# Patient Record
Sex: MALE | Race: WHITE | NOT HISPANIC OR LATINO | URBAN - METROPOLITAN AREA
[De-identification: names, ages, dates, MRNs, and addresses within clinical notes are randomized per-mention and may not be internally consistent; named-entity substitution may affect disease eponyms.]

---

## 2017-01-07 ENCOUNTER — EMERGENCY (EMERGENCY)
Facility: HOSPITAL | Age: 22
LOS: 1 days | Discharge: LEFT BEFORE DISCHARGE | End: 2017-01-07
Admitting: EMERGENCY MEDICINE

## 2017-01-07 VITALS
HEART RATE: 97 BPM | RESPIRATION RATE: 16 BRPM | DIASTOLIC BLOOD PRESSURE: 81 MMHG | SYSTOLIC BLOOD PRESSURE: 135 MMHG | TEMPERATURE: 97 F | OXYGEN SATURATION: 100 %

## 2017-02-22 ENCOUNTER — EMERGENCY (EMERGENCY)
Facility: HOSPITAL | Age: 22
LOS: 1 days | Discharge: HOME | End: 2017-02-22
Admitting: EMERGENCY MEDICINE
Payer: COMMERCIAL

## 2017-02-22 VITALS
TEMPERATURE: 99 F | RESPIRATION RATE: 18 BRPM | OXYGEN SATURATION: 100 % | HEART RATE: 83 BPM | SYSTOLIC BLOOD PRESSURE: 121 MMHG | DIASTOLIC BLOOD PRESSURE: 62 MMHG

## 2017-02-22 PROCEDURE — 99053 MED SERV 10PM-8AM 24 HR FAC: CPT

## 2017-02-22 PROCEDURE — 12011 RPR F/E/E/N/L/M 2.5 CM/<: CPT

## 2017-02-22 PROCEDURE — 99283 EMERGENCY DEPT VISIT LOW MDM: CPT | Mod: 25

## 2017-02-22 NOTE — ED ADULT TRIAGE NOTE - CHIEF COMPLAINT QUOTE
Pt playing basketball and got elbowed below left eye. 1 inch laceration noted below eye.  Denies HA, blurry vision, LOC. Denies any pain.

## 2017-02-23 RX ORDER — TETANUS TOXOID, REDUCED DIPHTHERIA TOXOID AND ACELLULAR PERTUSSIS VACCINE, ADSORBED 5; 2.5; 8; 8; 2.5 [IU]/.5ML; [IU]/.5ML; UG/.5ML; UG/.5ML; UG/.5ML
0.5 SUSPENSION INTRAMUSCULAR ONCE
Qty: 0 | Refills: 0 | Status: DISCONTINUED | OUTPATIENT
Start: 2017-02-23 | End: 2017-02-23

## 2017-02-23 NOTE — ED PROVIDER NOTE - MEDICAL DECISION MAKING DETAILS
20y/o M w. lac to infraorbital region, no LOC, no facial tenderness, pt does not want plastics. Plan suture lac, tetanus up to date.

## 2017-02-23 NOTE — ED PROVIDER NOTE - OBJECTIVE STATEMENT
20 y/o M no PMHx p/w laceration below the left eye sp getting elbowed while playing basketball earlier today. States he cleaned the area with soap and water, bleeding was controlled, came to the ED to get "checked out". Denies fevers, chills, LOC, headache, head injury, pain with EOM, or any other complaints. Tetanus up to date.

## 2017-02-23 NOTE — ED PROVIDER NOTE - CARE PLAN
Principal Discharge DX:	Laceration  Instructions for follow-up, activity and diet:	Keep sutures covered and dry for 24 hours then clean with soap and water daily.  Apply bacitracin and cover.  Return to ED for suture removal in 3 days. Any increased pain, redness, streaking (red lines), swelling, fever, chills return to ER. Take Tylenol 650mg 1 tab every 4-6 hours as needed for pain . You may have a headache associated with nausea in the next few hours/days. This is called a concussion and does not warrant return to the ED unless you develop significant worsening of pain, profuse vomiting, dizziness, changes in vision, difficulty walking/speaking,  weakness or numbness to your extremities.

## 2017-02-23 NOTE — ED PROVIDER NOTE - PLAN OF CARE
Keep sutures covered and dry for 24 hours then clean with soap and water daily.  Apply bacitracin and cover.  Return to ED for suture removal in 3 days. Any increased pain, redness, streaking (red lines), swelling, fever, chills return to ER. Take Tylenol 650mg 1 tab every 4-6 hours as needed for pain . You may have a headache associated with nausea in the next few hours/days. This is called a concussion and does not warrant return to the ED unless you develop significant worsening of pain, profuse vomiting, dizziness, changes in vision, difficulty walking/speaking,  weakness or numbness to your extremities.

## 2017-02-23 NOTE — ED PROCEDURE NOTE - CPROC ED POST PROC CARE GUIDE1
Verbal/written post procedure instructions were given to patient/caregiver./Keep the cast/splint/dressing clean and dry./Instructed patient/caregiver to follow-up with primary care physician./Instructed patient/caregiver regarding signs and symptoms of infection.

## 2017-02-23 NOTE — ED PROVIDER NOTE - EYES, MLM
Clear bilaterally, pupils equal, round and reactive to light, EOMI patrizia. 1cm linear laceration noted to infraorbital region, no active bleeding or drainage

## 2019-01-13 ENCOUNTER — EMERGENCY (EMERGENCY)
Facility: HOSPITAL | Age: 24
LOS: 1 days | Discharge: HOME | End: 2019-01-13
Attending: EMERGENCY MEDICINE
Payer: SELF-PAY

## 2019-01-13 VITALS
TEMPERATURE: 98 F | SYSTOLIC BLOOD PRESSURE: 123 MMHG | HEART RATE: 60 BPM | RESPIRATION RATE: 18 BRPM | DIASTOLIC BLOOD PRESSURE: 64 MMHG | OXYGEN SATURATION: 98 %

## 2019-01-13 VITALS
DIASTOLIC BLOOD PRESSURE: 73 MMHG | HEIGHT: 67 IN | WEIGHT: 154.1 LBS | SYSTOLIC BLOOD PRESSURE: 129 MMHG | TEMPERATURE: 98 F | OXYGEN SATURATION: 99 % | HEART RATE: 63 BPM | RESPIRATION RATE: 18 BRPM

## 2019-01-13 PROCEDURE — 71046 X-RAY EXAM CHEST 2 VIEWS: CPT

## 2019-01-13 PROCEDURE — 99283 EMERGENCY DEPT VISIT LOW MDM: CPT | Mod: 25

## 2019-01-13 PROCEDURE — 99053 MED SERV 10PM-8AM 24 HR FAC: CPT
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
## 2019-01-13 NOTE — ED PROVIDER NOTE - NSFOLLOWUPINSTRUCTIONS_ED_ALL_ED_FT
Hydrate.  Motrin (Advil or Ibuprofen) 600mg every 8hours for pain with food.  Follow up with your Dr. in 2days for reevaluation.  Return for any concerns or worsening symptoms. Follow up with your Primary Doctor this week as needed.    ACETAMINOPHEN and / or IBUPROFEN as directed for pain -- see medication warnings.    Seek immediate medical care for new/worsening symptoms/concerns.
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
## 2019-01-14 PROCEDURE — 71046 X-RAY EXAM CHEST 2 VIEWS: CPT | Mod: 26

## 2020-03-16 ENCOUNTER — EMERGENCY (EMERGENCY)
Facility: HOSPITAL | Age: 25
LOS: 1 days | Discharge: HOME | End: 2020-03-16
Attending: EMERGENCY MEDICINE | Admitting: EMERGENCY MEDICINE
Payer: MEDICAID

## 2020-03-16 VITALS
HEART RATE: 102 BPM | RESPIRATION RATE: 17 BRPM | OXYGEN SATURATION: 98 % | DIASTOLIC BLOOD PRESSURE: 66 MMHG | SYSTOLIC BLOOD PRESSURE: 141 MMHG | TEMPERATURE: 102 F

## 2020-03-16 PROCEDURE — 99283 EMERGENCY DEPT VISIT LOW MDM: CPT

## 2020-03-16 NOTE — ED PROVIDER NOTE - OBJECTIVE STATEMENT
25 yo M no sig pmh presents with cough, fever, myalgia x2 days.  Patient states cough is non-productive, no hemoptysis.  Report subjective fever at home but no measured temperatures.  Also with chest tightness after cough, but non-exertional, non-pleuritic, non-radiating.  No associated shortness of breath  States his girlfriend is sick with similar illness.  No recent travel.  No known contacts with covid 19 positive individuals.  States he attended [employer] recently.    Current some day cigarette smoker.   He presents requesting covid 19 testing.

## 2020-03-16 NOTE — ED PROVIDER NOTE - PATIENT PORTAL LINK FT
You can access the FollowMyHealth Patient Portal offered by [facility] by registering at the following website: [email or web address]. By joining [facility]’s FollowMyHealth portal, you will also be able to view your health information using other applications (apps) compatible with our system.

## 2020-03-16 NOTE — ED PROVIDER NOTE - NSFOLLOWUPINSTRUCTIONS_ED_ALL_ED_FT
Please refer to the [facility] Abigail Stewart COVID-19 packet given to your for further information and return precautions.

## 2020-03-16 NOTE — ED PROVIDER NOTE - CLINICAL SUMMARY MEDICAL DECISION MAKING FREE TEXT BOX
25 yo M no sig pmh presents with cough, fever, myalgia x2 days.  Febrile on arrival, bp stable, no hypoxia.  Patient non-toxic appearing, aaox3, lungs clear b/l, no resp distress, oropharynx clear.  Suspect viral syndrome.  It was explained to patient that he may have covid 19 and that he should self-quarantine, however not meeting institutional testing at this time.  I recommended to patient that he undergo rvp testing to look for other infectious etiology, cxr to evaluate for pna, and labs/iv to assess for end organ damage and IV hydration.  At this time, patient declines further medical care as he only came to find out if he was covid 19 positive.  Risks of leaving without workup explained including pneumonia, sepsis, permanent organ failure, come and death explained to patient and he continued to decline.  He was aaox3, not altered or intoxicated appearing and demonstrated a reasonable understanding of risks.  Will discharge.

## 2023-10-18 NOTE — ED PROVIDER NOTE - CROS ED CARDIOVAS ALL NEG
What Type Of Note Output Would You Prefer (Optional)?: Standard Output
How Severe Is Your Rash?: mild
Is This A New Presentation, Or A Follow-Up?: Rash
negative...